# Patient Record
Sex: MALE | Race: WHITE | NOT HISPANIC OR LATINO
[De-identification: names, ages, dates, MRNs, and addresses within clinical notes are randomized per-mention and may not be internally consistent; named-entity substitution may affect disease eponyms.]

---

## 2017-02-17 ENCOUNTER — MEDICATION RENEWAL (OUTPATIENT)
Age: 52
End: 2017-02-17

## 2017-10-13 ENCOUNTER — RX RENEWAL (OUTPATIENT)
Age: 52
End: 2017-10-13

## 2018-02-26 ENCOUNTER — NON-APPOINTMENT (OUTPATIENT)
Age: 53
End: 2018-02-26

## 2018-02-26 ENCOUNTER — APPOINTMENT (OUTPATIENT)
Dept: CARDIOLOGY | Facility: CLINIC | Age: 53
End: 2018-02-26
Payer: COMMERCIAL

## 2018-02-26 VITALS
OXYGEN SATURATION: 96 % | BODY MASS INDEX: 25.05 KG/M2 | DIASTOLIC BLOOD PRESSURE: 99 MMHG | HEIGHT: 70 IN | WEIGHT: 175 LBS | HEART RATE: 82 BPM | SYSTOLIC BLOOD PRESSURE: 156 MMHG

## 2018-02-26 DIAGNOSIS — I65.29 OCCLUSION AND STENOSIS OF UNSPECIFIED CAROTID ARTERY: ICD-10-CM

## 2018-02-26 DIAGNOSIS — R09.89 OTHER SPECIFIED SYMPTOMS AND SIGNS INVOLVING THE CIRCULATORY AND RESPIRATORY SYSTEMS: ICD-10-CM

## 2018-02-26 PROCEDURE — 99215 OFFICE O/P EST HI 40 MIN: CPT

## 2018-02-26 PROCEDURE — 93000 ELECTROCARDIOGRAM COMPLETE: CPT

## 2018-03-26 ENCOUNTER — RX RENEWAL (OUTPATIENT)
Age: 53
End: 2018-03-26

## 2018-03-29 ENCOUNTER — RX RENEWAL (OUTPATIENT)
Age: 53
End: 2018-03-29

## 2018-05-15 ENCOUNTER — TRANSCRIPTION ENCOUNTER (OUTPATIENT)
Age: 53
End: 2018-05-15

## 2018-05-16 ENCOUNTER — OUTPATIENT (OUTPATIENT)
Dept: OUTPATIENT SERVICES | Facility: HOSPITAL | Age: 53
LOS: 1 days | Discharge: HOME | End: 2018-05-16
Payer: COMMERCIAL

## 2018-05-16 ENCOUNTER — RESULT REVIEW (OUTPATIENT)
Age: 53
End: 2018-05-16

## 2018-05-16 DIAGNOSIS — K21.0 GASTRO-ESOPHAGEAL REFLUX DISEASE WITH ESOPHAGITIS: ICD-10-CM

## 2018-05-16 PROCEDURE — 88305 TISSUE EXAM BY PATHOLOGIST: CPT

## 2018-05-16 PROCEDURE — 88305 TISSUE EXAM BY PATHOLOGIST: CPT | Mod: 26

## 2018-05-16 PROCEDURE — 43239 EGD BIOPSY SINGLE/MULTIPLE: CPT

## 2018-05-16 PROCEDURE — 88312 SPECIAL STAINS GROUP 1: CPT | Mod: 26

## 2018-05-16 PROCEDURE — 88312 SPECIAL STAINS GROUP 1: CPT

## 2018-05-17 LAB — SURGICAL PATHOLOGY FINAL REPORT - CH: SIGNIFICANT CHANGE UP

## 2018-12-31 ENCOUNTER — RX RENEWAL (OUTPATIENT)
Age: 53
End: 2018-12-31

## 2019-01-22 ENCOUNTER — APPOINTMENT (OUTPATIENT)
Dept: CARDIOLOGY | Facility: CLINIC | Age: 54
End: 2019-01-22
Payer: COMMERCIAL

## 2019-01-22 PROCEDURE — 93306 TTE W/DOPPLER COMPLETE: CPT

## 2019-01-24 ENCOUNTER — APPOINTMENT (OUTPATIENT)
Dept: CARDIOLOGY | Facility: CLINIC | Age: 54
End: 2019-01-24
Payer: COMMERCIAL

## 2019-02-07 ENCOUNTER — APPOINTMENT (OUTPATIENT)
Dept: CARDIOLOGY | Facility: CLINIC | Age: 54
End: 2019-02-07
Payer: COMMERCIAL

## 2019-02-07 PROCEDURE — 93880 EXTRACRANIAL BILAT STUDY: CPT

## 2019-02-19 ENCOUNTER — NON-APPOINTMENT (OUTPATIENT)
Age: 54
End: 2019-02-19

## 2019-02-19 ENCOUNTER — APPOINTMENT (OUTPATIENT)
Dept: CARDIOLOGY | Facility: CLINIC | Age: 54
End: 2019-02-19
Payer: COMMERCIAL

## 2019-02-19 ENCOUNTER — APPOINTMENT (OUTPATIENT)
Dept: CARDIOLOGY | Facility: CLINIC | Age: 54
End: 2019-02-19

## 2019-02-19 VITALS
HEIGHT: 70 IN | HEART RATE: 82 BPM | OXYGEN SATURATION: 97 % | DIASTOLIC BLOOD PRESSURE: 87 MMHG | BODY MASS INDEX: 24.91 KG/M2 | SYSTOLIC BLOOD PRESSURE: 134 MMHG | WEIGHT: 174 LBS

## 2019-02-19 PROCEDURE — 99215 OFFICE O/P EST HI 40 MIN: CPT

## 2019-02-19 PROCEDURE — 93000 ELECTROCARDIOGRAM COMPLETE: CPT

## 2019-02-19 NOTE — REVIEW OF SYSTEMS
[see HPI] : see HPI [Negative] : Heme/Lymph [Fever] : no fever [Headache] : no headache [Chills] : no chills [Feeling Fatigued] : not feeling fatigued [Blurry Vision] : no blurred vision [Skin: A Rash] : no rash:

## 2019-02-19 NOTE — HISTORY OF PRESENT ILLNESS
[FreeTextEntry1] : I saw Mateusz Quiñones in the office today for followup. He is a 53 y-o- white male with history of paroxysmal atrial fibrillation who underwent a pulmonary vein ablation procedure at [facility] in January 2011. He had been maintained on sotalol, pradaxa, and atenolol.He is off pradaxa and now on aspirin.\par \par In 2013 he was having more atrial fibrillation was found to be hyperthyroid, Since this was treated his atrial fibrillation has been much improved. Last year he was having rare episodes of atrial fibrillation mostly occurring after he had a sixpack of beer. We started him back on sotalol 40 mg twice a day and over the course of the year he has had at most 4 or 5 episodes lasting up to 4 hours. Again the majority episodes occur after he has drank beer. He has no chest pain, or shortness of breath.. \par \par He also had a ramus branch drug-eluting stent placed in January 2011.  He is also being followed for mitral valve polyps with mild mitral insufficiency on an echo in 6/12  A repeat echo performed one/19 demonstrates a posterior mitral prolapse with an increase in the amount of MR, now moderate in severity. He has finally stopped smoking, but has gained some weight.\par \par He had surgery on his cervical spine and has been doing well.\par \par We did a regular stress test in June 2013 that showed no ischemia. He had a carotid Doppler that shows mild plaque. Resting 12-lead electrocardiogram demonstrates sinus rhythm and appears to be within normal limits.

## 2019-02-19 NOTE — DISCUSSION/SUMMARY
[FreeTextEntry1] : The patient is doing well. Has no signs or symptoms of active heart disease.He is sStill having episodes clinically of atrial fibrillation although they are short-lived. He is going to make an appointment to see Dr. Gamino who did his initial ablation to get an opinion. He will stay on his present medication. We did discuss the positive going back on anticoagulant which is not in favor of doing. Does understand there is some risk of developing a stroke if he goes into prolonged atrial fibrillation. Also discussed about cutting back on his weekend drinking.\par \par Going to increase enalapril to 10 mg a day. Assuming he has no side effects this may help to reduce the mitral insufficiency. I would appreciate your  sending copies of his recent blood work for my review.\par \par He has an appointment for stress test later this week which will discuss.\par \par Further recommendations will be made pending the above. I will see the patient again in approximately 6 months.

## 2019-02-19 NOTE — PHYSICAL EXAM
[General Appearance - Well Developed] : well developed [Normal Appearance] : normal appearance [Well Groomed] : well groomed [General Appearance - Well Nourished] : well nourished [No Deformities] : no deformities [General Appearance - In No Acute Distress] : no acute distress [Normal Conjunctiva] : the conjunctiva exhibited no abnormalities [Eyelids - No Xanthelasma] : the eyelids demonstrated no xanthelasmas [Normal Oral Mucosa] : normal oral mucosa [Normal Jugular Venous A Waves Present] : normal jugular venous A waves present [Normal Jugular Venous V Waves Present] : normal jugular venous V waves present [No Jugular Venous Riggs A Waves] : no jugular venous riggs A waves [Respiration, Rhythm And Depth] : normal respiratory rhythm and effort [Exaggerated Use Of Accessory Muscles For Inspiration] : no accessory muscle use [Auscultation Breath Sounds / Voice Sounds] : lungs were clear to auscultation bilaterally [Bowel Sounds] : normal bowel sounds [Abdomen Soft] : soft [Abdomen Tenderness] : non-tender [Abnormal Walk] : normal gait [Gait - Sufficient For Exercise Testing] : the gait was sufficient for exercise testing [Nail Clubbing] : no clubbing of the fingernails [Cyanosis, Localized] : no localized cyanosis [Skin Color & Pigmentation] : normal skin color and pigmentation [Skin Turgor] : normal skin turgor [] : no rash [Oriented To Time, Place, And Person] : oriented to person, place, and time [Impaired Insight] : insight and judgment were intact [Affect] : the affect was normal [Mood] : the mood was normal [No Anxiety] : not feeling anxious [5th Left ICS - MCL] : palpated at the 5th LICS in the midclavicular line [Normal] : normal [Normal Rate] : normal [Rhythm Regular] : regular [Normal S1] : normal S1 [Normal S2] : normal S2 [No Gallop] : no gallop heard [II] : a grade 2 [2+] : left 2+ [1+] : left 1+ [No Abnormalities] : the abdominal aorta was not enlarged and no bruit was heard [No Pitting Edema] : no pitting edema present [FreeTextEntry1] : Minimal scattered rhonchi with minimal wheeze [Apical Thrill] : no thrill palpable at the apex [Click] : no click [Pericardial Rub] : no pericardial rub

## 2019-02-19 NOTE — REASON FOR VISIT
[Follow-Up - Clinic] : a clinic follow-up of [Atrial Fibrillation] : atrial fibrillation [Chest Pain] : chest pain [Coronary Artery Disease] : coronary artery disease [Hyperlipidemia] : hyperlipidemia [Hypertension] : hypertension

## 2019-02-28 ENCOUNTER — APPOINTMENT (OUTPATIENT)
Dept: CARDIOLOGY | Facility: CLINIC | Age: 54
End: 2019-02-28

## 2019-05-13 ENCOUNTER — RX RENEWAL (OUTPATIENT)
Age: 54
End: 2019-05-13

## 2019-06-11 ENCOUNTER — RX RENEWAL (OUTPATIENT)
Age: 54
End: 2019-06-11

## 2019-07-08 ENCOUNTER — MEDICATION RENEWAL (OUTPATIENT)
Age: 54
End: 2019-07-08

## 2019-08-07 ENCOUNTER — RX RENEWAL (OUTPATIENT)
Age: 54
End: 2019-08-07

## 2020-05-10 ENCOUNTER — RX RENEWAL (OUTPATIENT)
Age: 55
End: 2020-05-10

## 2020-05-21 ENCOUNTER — RX RENEWAL (OUTPATIENT)
Age: 55
End: 2020-05-21

## 2020-06-18 ENCOUNTER — NON-APPOINTMENT (OUTPATIENT)
Age: 55
End: 2020-06-18

## 2020-06-18 ENCOUNTER — APPOINTMENT (OUTPATIENT)
Dept: CARDIOLOGY | Facility: CLINIC | Age: 55
End: 2020-06-18
Payer: COMMERCIAL

## 2020-06-18 VITALS
HEART RATE: 60 BPM | DIASTOLIC BLOOD PRESSURE: 89 MMHG | BODY MASS INDEX: 24.34 KG/M2 | HEIGHT: 70 IN | WEIGHT: 170 LBS | OXYGEN SATURATION: 99 % | SYSTOLIC BLOOD PRESSURE: 133 MMHG

## 2020-06-18 DIAGNOSIS — I34.1 NONRHEUMATIC MITRAL (VALVE) PROLAPSE: ICD-10-CM

## 2020-06-18 PROCEDURE — 99214 OFFICE O/P EST MOD 30 MIN: CPT

## 2020-06-18 PROCEDURE — 93000 ELECTROCARDIOGRAM COMPLETE: CPT

## 2020-06-18 NOTE — PHYSICAL EXAM
[General Appearance - Well Developed] : well developed [Normal Appearance] : normal appearance [General Appearance - Well Nourished] : well nourished [Well Groomed] : well groomed [No Deformities] : no deformities [General Appearance - In No Acute Distress] : no acute distress [Normal Conjunctiva] : the conjunctiva exhibited no abnormalities [Eyelids - No Xanthelasma] : the eyelids demonstrated no xanthelasmas [Normal Oral Mucosa] : normal oral mucosa [Normal Jugular Venous A Waves Present] : normal jugular venous A waves present [No Jugular Venous Riggs A Waves] : no jugular venous riggs A waves [Normal Jugular Venous V Waves Present] : normal jugular venous V waves present [Respiration, Rhythm And Depth] : normal respiratory rhythm and effort [Exaggerated Use Of Accessory Muscles For Inspiration] : no accessory muscle use [Auscultation Breath Sounds / Voice Sounds] : lungs were clear to auscultation bilaterally [Bowel Sounds] : normal bowel sounds [Abdomen Soft] : soft [Abnormal Walk] : normal gait [Abdomen Tenderness] : non-tender [Gait - Sufficient For Exercise Testing] : the gait was sufficient for exercise testing [Nail Clubbing] : no clubbing of the fingernails [Cyanosis, Localized] : no localized cyanosis [Skin Turgor] : normal skin turgor [Skin Color & Pigmentation] : normal skin color and pigmentation [] : no rash [Affect] : the affect was normal [Impaired Insight] : insight and judgment were intact [Oriented To Time, Place, And Person] : oriented to person, place, and time [Mood] : the mood was normal [No Anxiety] : not feeling anxious [Normal] : normal [5th Left ICS - MCL] : palpated at the 5th LICS in the midclavicular line [Rhythm Regular] : regular [Normal Rate] : normal [Normal S2] : normal S2 [Normal S1] : normal S1 [No Gallop] : no gallop heard [II] : a grade 2 [2+] : left 2+ [1+] : right 1+ [No Abnormalities] : the abdominal aorta was not enlarged and no bruit was heard [No Pitting Edema] : no pitting edema present [FreeTextEntry1] : Minimal scattered rhonchi with minimal wheeze [Click] : no click [Apical Thrill] : no thrill palpable at the apex [Pericardial Rub] : no pericardial rub

## 2020-06-18 NOTE — HISTORY OF PRESENT ILLNESS
[FreeTextEntry1] : I saw Mateusz Quiñones in the office today for followup. He is a 54 y-o- white male with history of paroxysmal atrial fibrillation who underwent a pulmonary vein ablation procedure at [facility] in January 2011. He had been maintained on sotalol, pradaxa, and atenolol.He is off pradaxa and now on aspirin.\par \par In 2013 he was having more atrial fibrillation was found to be hyperthyroid, Since this was treated his atrial fibrillation has been much improved. He was having rare episodes of atrial fibrillation mostly occurring after he had a sixpack of beer. We started him back on sotalol 40 mg twice a day and over the course of the year he has had at most 4 or 5 episodes lasting up to 4 hours. Again the majority episodes occur after he has drank beer. He has no chest pain, or shortness of breath.. \par \par He also had a ramus branch drug-eluting stent placed in January 2011.  He is also being followed for mitral valve polyps with mild mitral insufficiency on an echo in 6/12  A repeat echo performed 1/19 demonstrates a posterior mitral prolapse with an increase in the amount of MR, now moderate in severity. He has finally stopped smoking, but has gained some weight.\par \par He had surgery on his cervical spine and has been doing well.\par \par We did a regular stress test in June 2013 that showed no ischemia. He had a carotid Doppler that shows mild plaque. Resting 12-lead electrocardiogram demonstrates sinus rhythm and appears to be within normal limits.\par \par The patient is physically active and denies any chest pain or shortness of breath. On rare occasions, especially after having alcohol he does get short lived episodes of atrial fibrillation that are hemodynamically stable

## 2020-06-18 NOTE — DISCUSSION/SUMMARY
[FreeTextEntry1] : Clinically the patient is doing well. He denies any exertional chest pain or shortness of breath. Blood pressure is well controlled. .ECG is normal.\par \par He will schedule a nuclear stress test. He occasionally gets a discomfort in his chest that most likely is noncardiac. It's been a long time since his last stress test and his stent. He also will followup on an echocardiogram to check on his mitral valve prolapse.\par \par Would appreciate sending copies most recent blood work. We did discuss the importance of diet, exercise, and weight control.\par \par I reviewed his medication and carotid doppler,  and answered his questions. Assuming his tests are okay and the patient feels well I would see him again in approximately one year. He knows to call the office if any problems arise.

## 2020-06-18 NOTE — REVIEW OF SYSTEMS
[see HPI] : see HPI [Negative] : Heme/Lymph [Fever] : no fever [Headache] : no headache [Chills] : no chills [Feeling Fatigued] : not feeling fatigued [Skin: A Rash] : no rash: [Blurry Vision] : no blurred vision

## 2020-08-17 ENCOUNTER — RX RENEWAL (OUTPATIENT)
Age: 55
End: 2020-08-17

## 2020-09-16 ENCOUNTER — APPOINTMENT (OUTPATIENT)
Dept: CARDIOLOGY | Facility: CLINIC | Age: 55
End: 2020-09-16

## 2020-10-22 ENCOUNTER — APPOINTMENT (OUTPATIENT)
Dept: CARDIOLOGY | Facility: CLINIC | Age: 55
End: 2020-10-22
Payer: COMMERCIAL

## 2020-10-22 PROCEDURE — 93306 TTE W/DOPPLER COMPLETE: CPT

## 2020-10-22 PROCEDURE — 99072 ADDL SUPL MATRL&STAF TM PHE: CPT

## 2020-12-31 ENCOUNTER — RX RENEWAL (OUTPATIENT)
Age: 55
End: 2020-12-31

## 2021-01-07 RX ORDER — RAMIPRIL 5 MG/1
5 CAPSULE ORAL
Qty: 90 | Refills: 3 | Status: DISCONTINUED | COMMUNITY
Start: 2019-05-13 | End: 2021-01-07

## 2021-01-22 ENCOUNTER — APPOINTMENT (OUTPATIENT)
Dept: CARDIOLOGY | Facility: CLINIC | Age: 56
End: 2021-01-22
Payer: COMMERCIAL

## 2021-01-22 PROCEDURE — 78452 HT MUSCLE IMAGE SPECT MULT: CPT

## 2021-01-22 PROCEDURE — 99072 ADDL SUPL MATRL&STAF TM PHE: CPT

## 2021-01-22 PROCEDURE — 93015 CV STRESS TEST SUPVJ I&R: CPT

## 2021-01-22 PROCEDURE — A9500: CPT

## 2021-01-26 ENCOUNTER — RX RENEWAL (OUTPATIENT)
Age: 56
End: 2021-01-26

## 2021-03-12 ENCOUNTER — NON-APPOINTMENT (OUTPATIENT)
Age: 56
End: 2021-03-12

## 2021-07-21 ENCOUNTER — RX RENEWAL (OUTPATIENT)
Age: 56
End: 2021-07-21

## 2021-07-23 ENCOUNTER — RX RENEWAL (OUTPATIENT)
Age: 56
End: 2021-07-23

## 2021-11-12 ENCOUNTER — OUTPATIENT (OUTPATIENT)
Dept: OUTPATIENT SERVICES | Facility: HOSPITAL | Age: 56
LOS: 1 days | End: 2021-11-12
Payer: COMMERCIAL

## 2021-11-12 ENCOUNTER — APPOINTMENT (OUTPATIENT)
Dept: RADIOLOGY | Facility: CLINIC | Age: 56
End: 2021-11-12
Payer: COMMERCIAL

## 2021-11-12 DIAGNOSIS — Z00.8 ENCOUNTER FOR OTHER GENERAL EXAMINATION: ICD-10-CM

## 2021-11-12 PROCEDURE — 72050 X-RAY EXAM NECK SPINE 4/5VWS: CPT | Mod: 26

## 2021-11-12 PROCEDURE — 72050 X-RAY EXAM NECK SPINE 4/5VWS: CPT

## 2022-01-11 ENCOUNTER — RX RENEWAL (OUTPATIENT)
Age: 57
End: 2022-01-11

## 2022-01-14 ENCOUNTER — RX RENEWAL (OUTPATIENT)
Age: 57
End: 2022-01-14

## 2022-01-25 ENCOUNTER — APPOINTMENT (OUTPATIENT)
Dept: CARDIOLOGY | Facility: CLINIC | Age: 57
End: 2022-01-25
Payer: COMMERCIAL

## 2022-01-25 ENCOUNTER — NON-APPOINTMENT (OUTPATIENT)
Age: 57
End: 2022-01-25

## 2022-01-25 VITALS
SYSTOLIC BLOOD PRESSURE: 161 MMHG | BODY MASS INDEX: 23.62 KG/M2 | HEART RATE: 75 BPM | HEIGHT: 70 IN | DIASTOLIC BLOOD PRESSURE: 99 MMHG | OXYGEN SATURATION: 97 % | WEIGHT: 165 LBS

## 2022-01-25 DIAGNOSIS — E78.5 HYPERLIPIDEMIA, UNSPECIFIED: ICD-10-CM

## 2022-01-25 DIAGNOSIS — I10 ESSENTIAL (PRIMARY) HYPERTENSION: ICD-10-CM

## 2022-01-25 DIAGNOSIS — I48.0 PAROXYSMAL ATRIAL FIBRILLATION: ICD-10-CM

## 2022-01-25 DIAGNOSIS — I25.10 ATHEROSCLEROTIC HEART DISEASE OF NATIVE CORONARY ARTERY W/OUT ANGINA PECTORIS: ICD-10-CM

## 2022-01-25 PROCEDURE — 99214 OFFICE O/P EST MOD 30 MIN: CPT

## 2022-01-25 PROCEDURE — 93000 ELECTROCARDIOGRAM COMPLETE: CPT

## 2022-01-25 NOTE — HISTORY OF PRESENT ILLNESS
[FreeTextEntry1] : I saw Mateusz Quiñones in the office today for followup. He is a 56 y-o- white male with history of paroxysmal atrial fibrillation who underwent a pulmonary vein ablation procedure at [facility] in January 2011. He had been maintained on sotalol, pradaxa, and atenolol.He is off pradaxa and now on aspirin.\par \par In 2013 he was having more atrial fibrillation was found to be hyperthyroid, Since this was treated his atrial fibrillation has been much improved. He was having rare episodes of atrial fibrillation mostly occurring after he had a sixpack of beer. We started him back on sotalol 40 mg twice a day and over the course of the year he has had at most 4 or 5 episodes lasting up to 4 hours. Again the majority episodes occur after he has drank beer. He has no chest pain, or shortness of breath.. \par \par He also had a ramus branch drug-eluting stent placed in January 2011.  He is also being followed for mitral valve polyps with mild mitral insufficiency on an echo in 6/12  A repeat echo performed 10/20 demonstrates mild-mod MR, mild TR, LVH, stage I DD.. He has finally stopped smoking, but has gained some weight.  \par \par He had surgery on his cervical spine and has been doing well.\par \par Stress test 1/21 was normal at workload of 8 METS.  EF 62%.  He had a carotid Doppler that shows mild plaque. Resting 12-lead electrocardiogram demonstrates sinus rhythm and appears to be within normal limits.\par \[facility] Had a second A. fib ablation at [facility] 4/21 by Dr. Gamino.  He was placed on Xarelto which he stopped on his own about 5 months ago.  Told him that most likely Dr. Gamino would want him to continue this medication and he will call his office to find out.  He has been feeling quite well without any known episodes of atrial fibrillation. He still gets some irregular heartbeat.

## 2022-01-25 NOTE — DISCUSSION/SUMMARY
[FreeTextEntry1] : Clinically the patient is doing well.  On his medications blood pressure is well controlled.  Had normal stress test last year and a stable echocardiogram.  He is now almost year out from the second A. fib ablation.\par \par He will stay on his present medications and will follow-up with Dr. Gamino to find out about chronic anticoagulation.  We appreciate a copy of the most recent blood work for my review.  We did go over his medications.  We discussed the goal of cholesterol lowering, to keep the LDL less than 70.\par \par Patient will schedule an echocardiogram.  If all is well we will see him again in 1 year.  If he does have symptoms he would call. Danger to self

## 2022-07-08 ENCOUNTER — APPOINTMENT (OUTPATIENT)
Dept: MRI IMAGING | Facility: CLINIC | Age: 57
End: 2022-07-08

## 2022-07-08 ENCOUNTER — OUTPATIENT (OUTPATIENT)
Dept: OUTPATIENT SERVICES | Facility: HOSPITAL | Age: 57
LOS: 1 days | End: 2022-07-08
Payer: COMMERCIAL

## 2022-07-08 DIAGNOSIS — Z00.8 ENCOUNTER FOR OTHER GENERAL EXAMINATION: ICD-10-CM

## 2022-07-08 DIAGNOSIS — M54.12 RADICULOPATHY, CERVICAL REGION: ICD-10-CM

## 2022-07-08 PROCEDURE — 72141 MRI NECK SPINE W/O DYE: CPT

## 2022-07-08 PROCEDURE — 72141 MRI NECK SPINE W/O DYE: CPT | Mod: 26

## 2022-08-02 ENCOUNTER — OUTPATIENT (OUTPATIENT)
Dept: OUTPATIENT SERVICES | Facility: HOSPITAL | Age: 57
LOS: 1 days | End: 2022-08-02
Payer: COMMERCIAL

## 2022-08-02 DIAGNOSIS — Z20.828 CONTACT WITH AND (SUSPECTED) EXPOSURE TO OTHER VIRAL COMMUNICABLE DISEASES: ICD-10-CM

## 2022-08-02 LAB — SARS-COV-2 RNA SPEC QL NAA+PROBE: SIGNIFICANT CHANGE UP

## 2022-08-02 PROCEDURE — U0003: CPT

## 2022-08-02 PROCEDURE — U0005: CPT

## 2022-08-04 ENCOUNTER — OUTPATIENT (OUTPATIENT)
Dept: OUTPATIENT SERVICES | Facility: HOSPITAL | Age: 57
LOS: 1 days | End: 2022-08-04
Payer: COMMERCIAL

## 2022-08-04 DIAGNOSIS — M54.12 RADICULOPATHY, CERVICAL REGION: ICD-10-CM

## 2022-08-04 DIAGNOSIS — M54.16 RADICULOPATHY, LUMBAR REGION: ICD-10-CM

## 2022-08-04 PROCEDURE — 62321 NJX INTERLAMINAR CRV/THRC: CPT

## 2022-12-19 ENCOUNTER — RX RENEWAL (OUTPATIENT)
Age: 57
End: 2022-12-19

## 2023-02-08 ENCOUNTER — APPOINTMENT (OUTPATIENT)
Dept: CARDIOLOGY | Facility: CLINIC | Age: 58
End: 2023-02-08

## 2023-03-07 ENCOUNTER — RX RENEWAL (OUTPATIENT)
Age: 58
End: 2023-03-07

## 2023-04-07 RX ORDER — RAMIPRIL 5 MG/1
5 CAPSULE ORAL
Qty: 90 | Refills: 0 | Status: ACTIVE | COMMUNITY
Start: 2020-12-31

## 2023-04-14 ENCOUNTER — RX RENEWAL (OUTPATIENT)
Age: 58
End: 2023-04-14

## 2023-07-07 ENCOUNTER — APPOINTMENT (OUTPATIENT)
Dept: CARDIOLOGY | Facility: CLINIC | Age: 58
End: 2023-07-07

## 2023-07-14 ENCOUNTER — RX RENEWAL (OUTPATIENT)
Age: 58
End: 2023-07-14

## 2023-10-14 ENCOUNTER — RX RENEWAL (OUTPATIENT)
Age: 58
End: 2023-10-14

## 2023-11-15 ENCOUNTER — APPOINTMENT (OUTPATIENT)
Dept: ORTHOPEDIC SURGERY | Facility: CLINIC | Age: 58
End: 2023-11-15
Payer: OTHER MISCELLANEOUS

## 2023-11-15 VITALS — BODY MASS INDEX: 24.34 KG/M2 | HEIGHT: 70 IN | WEIGHT: 170 LBS

## 2023-11-15 DIAGNOSIS — S40.011A CONTUSION OF RIGHT SHOULDER, INITIAL ENCOUNTER: ICD-10-CM

## 2023-11-15 DIAGNOSIS — S70.01XA CONTUSION OF RIGHT HIP, INITIAL ENCOUNTER: ICD-10-CM

## 2023-11-15 PROCEDURE — 73030 X-RAY EXAM OF SHOULDER: CPT | Mod: RT

## 2023-11-15 PROCEDURE — 99244 OFF/OP CNSLTJ NEW/EST MOD 40: CPT

## 2023-11-15 PROCEDURE — 73502 X-RAY EXAM HIP UNI 2-3 VIEWS: CPT

## 2023-11-15 RX ORDER — DICLOFENAC POTASSIUM 50 MG/1
50 TABLET, COATED ORAL TWICE DAILY
Qty: 14 | Refills: 0 | Status: ACTIVE | COMMUNITY
Start: 2023-11-15 | End: 1900-01-01

## 2023-11-22 ENCOUNTER — APPOINTMENT (OUTPATIENT)
Dept: ORTHOPEDIC SURGERY | Facility: CLINIC | Age: 58
End: 2023-11-22
Payer: OTHER MISCELLANEOUS

## 2023-11-22 ENCOUNTER — APPOINTMENT (OUTPATIENT)
Dept: MRI IMAGING | Facility: CLINIC | Age: 58
End: 2023-11-22
Payer: OTHER MISCELLANEOUS

## 2023-11-22 VITALS — HEIGHT: 70 IN | BODY MASS INDEX: 24.34 KG/M2 | WEIGHT: 170 LBS

## 2023-11-22 DIAGNOSIS — N20.0 CALCULUS OF KIDNEY: ICD-10-CM

## 2023-11-22 PROCEDURE — 73221 MRI JOINT UPR EXTREM W/O DYE: CPT | Mod: RT

## 2023-11-22 PROCEDURE — 99213 OFFICE O/P EST LOW 20 MIN: CPT

## 2023-12-05 ENCOUNTER — APPOINTMENT (OUTPATIENT)
Dept: ORTHOPEDIC SURGERY | Facility: CLINIC | Age: 58
End: 2023-12-05
Payer: OTHER MISCELLANEOUS

## 2023-12-05 VITALS — WEIGHT: 170 LBS | HEIGHT: 70 IN | BODY MASS INDEX: 24.34 KG/M2

## 2023-12-05 PROCEDURE — 99213 OFFICE O/P EST LOW 20 MIN: CPT

## 2023-12-05 RX ORDER — DICLOFENAC POTASSIUM 50 MG/1
50 TABLET, COATED ORAL TWICE DAILY
Qty: 14 | Refills: 1 | Status: ACTIVE | COMMUNITY
Start: 2023-12-05 | End: 1900-01-01

## 2024-01-30 ENCOUNTER — APPOINTMENT (OUTPATIENT)
Dept: ORTHOPEDIC SURGERY | Facility: CLINIC | Age: 59
End: 2024-01-30
Payer: COMMERCIAL

## 2024-01-30 PROCEDURE — 99243 OFF/OP CNSLTJ NEW/EST LOW 30: CPT

## 2024-01-30 RX ORDER — DICLOFENAC POTASSIUM 50 MG/1
50 TABLET, COATED ORAL TWICE DAILY
Qty: 14 | Refills: 1 | Status: ACTIVE | COMMUNITY
Start: 2024-01-30 | End: 1900-01-01

## 2024-01-30 NOTE — HISTORY OF PRESENT ILLNESS
[7] : 7 [1] : 2 [Radiating] : radiating [Sharp] : sharp [Intermittent] : intermittent [Nothing helps with pain getting better] : Nothing helps with pain getting better [Sitting] : sitting [Full time] : Work status: full time [] : Post Surgical Visit: no [FreeTextEntry1] : right hip [FreeTextEntry7] : foot [de-identified] : driving

## 2024-01-30 NOTE — ASSESSMENT
[FreeTextEntry1] : Patient will be maintained on anti-inflammatory medication and an MRI will be scheduled to evaluate possible labral damage in the right hip he will return after the MRI is performed.  He will also attend some physical therapy to try and decrease the pain and increase his range of motion and strength and normalize his gait.  He will return to discuss the MRI scan

## 2024-01-30 NOTE — REASON FOR VISIT
[FreeTextEntry2] : NI - right hip Patient returns to the office with increasing complaints of the complaints in the right hip region some days are better and worse than others based on activity and weather.  Today he is ambulating with a mild antalgic gait on the right side he does have some localized tenderness tenderness and tendinitis over the right hip bursa his hip range of motion is mildly limited to internal rotation neurovascular status grossly intact in the right lower extremity.

## 2024-02-14 ENCOUNTER — APPOINTMENT (OUTPATIENT)
Dept: ORTHOPEDIC SURGERY | Facility: CLINIC | Age: 59
End: 2024-02-14

## 2024-02-15 ENCOUNTER — RESULT REVIEW (OUTPATIENT)
Age: 59
End: 2024-02-15

## 2024-02-21 ENCOUNTER — APPOINTMENT (OUTPATIENT)
Dept: ORTHOPEDIC SURGERY | Facility: CLINIC | Age: 59
End: 2024-02-21
Payer: OTHER MISCELLANEOUS

## 2024-02-21 PROCEDURE — 20605 DRAIN/INJ JOINT/BURSA W/O US: CPT | Mod: RT

## 2024-02-21 PROCEDURE — 99213 OFFICE O/P EST LOW 20 MIN: CPT | Mod: 25

## 2024-02-21 NOTE — ASSESSMENT
[FreeTextEntry1] : Patient was given a cortisone shot into the AC joint of his right shoulder today and hopefully this will give him some relief and also confirm the diagnosis of AC impingement shown on the MRI at this point it is unlikely that he will get any further improvement with conservative treatment and once again we are requesting authorization for the AC decompression and manipulation his MRI shows AC degenerative changes with spur formation and impingement on the underlying rotator cuff I am concerned that if he does not have his surgery in the near future he may end up ultimately tearing his rotator cuff and ending up with a more significant problem and return in 3 to 4 weeks and hopefully will have authorization for surgery at that point in time.

## 2024-02-21 NOTE — HISTORY OF PRESENT ILLNESS
[Work related] : work related [Throbbing] : throbbing [Constant] : constant [Meds] : meds [Walking] : walking [Full time] : Work status: full time [5] : 5 [de-identified] : 02/24/24: Patient returns to follow up on his right shoulder. He states the anti-inflammatory medication is not helping.  12/5/23: Patient returns to review the results of his right shoulder MRI.  11/15/23: Initial visit for this 59 y/o RHD M here for right hip and right shoulder pain from a work-related injury on 11/2/2023. He works for the town of [place]. 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 No prior injury to right side. [] : Post Surgical Visit: no [FreeTextEntry1] : right hip and right shoulder [FreeTextEntry3] : 11/2/23 [de-identified] : driving [de-identified] : mri [de-identified] : 6+ years ago [de-identified] : left shoulder

## 2024-02-21 NOTE — PROCEDURE
[FreeTextEntry3] : Combination of 1 cc of lidocaine and 1 cc of Depo-Medrol are mixed together the area of the right shoulder was cleaned off with alcohol and injection was instilled into the AC joint patient tolerated the procedure well actually felt a little improvement after the shot was performed

## 2024-02-21 NOTE — REASON FOR VISIT
[FreeTextEntry2] :  WC - right shoulder Patient returns to the office in follow-up of his right shoulder he reports that he still has significant problems with his shoulder especially on external rotation and abduction of the shoulder use of the anti-inflammatory medicine did not significantly help his problem examination today reveals no significant swelling in the shoulder his range of motion is limited approximately 70% to both external rotation and abduction is forward flexion strength is 4 out of 5 his abduction strength is also about 4 out of 5 he has no gross instability he is locally tender manage mainly at the AC joint with less tenderness at the proximal biceps tendon and none at the deltoid bursa he has no gross instability in the shoulder neurovascular status grossly intact in the right upper extremity he has mild tenderness in the right trapezius region consistent with overuse of the upper back to compensate for his shoulder deficit and motion and strength

## 2024-02-28 ENCOUNTER — APPOINTMENT (OUTPATIENT)
Dept: ORTHOPEDIC SURGERY | Facility: CLINIC | Age: 59
End: 2024-02-28
Payer: COMMERCIAL

## 2024-02-28 DIAGNOSIS — Z95.818 PRESENCE OF OTHER CARDIAC IMPLANTS AND GRAFTS: ICD-10-CM

## 2024-02-28 PROCEDURE — 99213 OFFICE O/P EST LOW 20 MIN: CPT

## 2024-02-28 NOTE — HISTORY OF PRESENT ILLNESS
[7] : 7 [1] : 2 [Radiating] : radiating [Sharp] : sharp [Nothing helps with pain getting better] : Nothing helps with pain getting better [Intermittent] : intermittent [Sitting] : sitting [Full time] : Work status: full time [FreeTextEntry1] : right hip [] : Post Surgical Visit: no [FreeTextEntry7] : foot [de-identified] : driving

## 2024-02-28 NOTE — ASSESSMENT
[FreeTextEntry1] : Patient will be attend some physical therapy for the right hip area to try and increase his motion strength and normalize his gait.  He will continue to take the anti-inflammatory medication.  He will return in approximately 3 weeks.

## 2024-02-28 NOTE — REASON FOR VISIT
[FreeTextEntry2] : MRI - right hip Patient returns to the office in follow-up regarding his right hip his recent MRI is reviewed which shows significant degenerative changes in the hip in addition to some damage to the right hip labrum examination with a mild antalgic gait range of motion right hip is mildly limited to internal rotation he has some mild trochanteric bursitis but he can take a few steps on his toes and heels his flexion toward the floor is limited by hamstring and lower back tightness.

## 2024-03-13 ENCOUNTER — APPOINTMENT (OUTPATIENT)
Dept: ORTHOPEDIC SURGERY | Facility: CLINIC | Age: 59
End: 2024-03-13
Payer: COMMERCIAL

## 2024-03-13 VITALS — WEIGHT: 170 LBS | HEIGHT: 70 IN | BODY MASS INDEX: 24.34 KG/M2

## 2024-03-13 DIAGNOSIS — M24.159 OTHER ARTICULAR CARTILAGE DISORDERS, UNSPECIFIED HIP: ICD-10-CM

## 2024-03-13 PROCEDURE — 99213 OFFICE O/P EST LOW 20 MIN: CPT

## 2024-03-13 NOTE — ASSESSMENT
[FreeTextEntry1] : Presently is just started physical therapy for the right hip and will continue to do so do so 2-3 times a week and also on his arm he will return in 3 to 4 weeks to monitor his progress.
stretcher

## 2024-03-13 NOTE — HISTORY OF PRESENT ILLNESS
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

## 2024-03-13 NOTE — REASON FOR VISIT
[FreeTextEntry2] : Continued right hip pain Patient returns to the office in follow-up regarding his right hip he describes having physical therapy this morning and try to work on increasing his motion today he is ambulate with a mild antalgic gait examination of the right hip reveals some mild tenderness over the trochanteric bursa's internal rotation on the right hip is moderately limited he has no gross instability and neurovascular status in the right lower extremity is grossly normal

## 2024-03-30 ENCOUNTER — RX RENEWAL (OUTPATIENT)
Age: 59
End: 2024-03-30

## 2024-04-10 ENCOUNTER — APPOINTMENT (OUTPATIENT)
Dept: ORTHOPEDIC SURGERY | Facility: CLINIC | Age: 59
End: 2024-04-10
Payer: OTHER MISCELLANEOUS

## 2024-04-10 VITALS — HEIGHT: 70 IN | WEIGHT: 170 LBS | BODY MASS INDEX: 24.34 KG/M2

## 2024-04-10 DIAGNOSIS — M25.811 OTHER SPECIFIED JOINT DISORDERS, RIGHT SHOULDER: ICD-10-CM

## 2024-04-10 DIAGNOSIS — M47.816 SPONDYLOSIS W/OUT MYELOPATHY OR RADICULOPATHY, LUMBAR REGION: ICD-10-CM

## 2024-04-10 PROCEDURE — 20605 DRAIN/INJ JOINT/BURSA W/O US: CPT | Mod: RT

## 2024-04-10 PROCEDURE — 99213 OFFICE O/P EST LOW 20 MIN: CPT | Mod: 25

## 2024-04-10 NOTE — PROCEDURE
[FreeTextEntry3] : Combination of 1 cc of lidocaine and 1 cc of dexamethasone mixed together the area on the front of the right shoulder AC joint was cleaned off with alcohol and injection was administered into the AC joint patient tolerated the procedure well

## 2024-04-10 NOTE — ASSESSMENT
[FreeTextEntry1] : Hopefully the injection today will help alleviate some of his symptoms and allow the therapist to try and increase his motion and strength in the right shoulder I still believe that he is likely to need an AC decompression to alleviate his symptoms on a chronic basis and prevent further damage to the rotator cuff in the future he will return in 3 to 4 weeks to discuss this further

## 2024-04-30 ENCOUNTER — APPOINTMENT (OUTPATIENT)
Dept: ORTHOPEDIC SURGERY | Facility: CLINIC | Age: 59
End: 2024-04-30
Payer: COMMERCIAL

## 2024-04-30 VITALS — WEIGHT: 170 LBS | BODY MASS INDEX: 24.34 KG/M2 | HEIGHT: 70 IN

## 2024-04-30 DIAGNOSIS — M19.049 PRIMARY OSTEOARTHRITIS, UNSPECIFIED HAND: ICD-10-CM

## 2024-04-30 DIAGNOSIS — M16.10 UNILATERAL PRIMARY OSTEOARTHRITIS, UNSPECIFIED HIP: ICD-10-CM

## 2024-04-30 PROCEDURE — 99213 OFFICE O/P EST LOW 20 MIN: CPT

## 2024-04-30 PROCEDURE — 73130 X-RAY EXAM OF HAND: CPT | Mod: RT

## 2024-04-30 RX ORDER — NAPROXEN 500 MG/1
500 TABLET ORAL TWICE DAILY
Qty: 14 | Refills: 1 | Status: ACTIVE | COMMUNITY
Start: 2024-04-30 | End: 1900-01-01

## 2024-04-30 NOTE — ASSESSMENT
[FreeTextEntry1] : Patient has signs shown some improvement in his right hip pain with the recent physical therapy.  He will continue doing the therapy and also continue work on exercises on his own and follow-up in approximately 2 to 3 weeks.

## 2024-04-30 NOTE — HISTORY OF PRESENT ILLNESS
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

## 2024-04-30 NOTE — REASON FOR VISIT
[FreeTextEntry2] : Continued right hip pain Patient returns to the office in follow-up regarding his right hip . pain is better with PT Patient reports that after 2 weeks of physical therapy he does have some improvement in the pain in the right hip examination today reveals ambulation with a fairly normal gait he has no significant trochanteric bursitis range of motion right hip is good to external rotation but does have some increased limitation to internal rotation of the right hip

## 2024-05-01 ENCOUNTER — RESULT CHARGE (OUTPATIENT)
Age: 59
End: 2024-05-01

## 2024-05-01 ENCOUNTER — APPOINTMENT (OUTPATIENT)
Dept: ORTHOPEDIC SURGERY | Facility: CLINIC | Age: 59
End: 2024-05-01
Payer: OTHER MISCELLANEOUS

## 2024-05-01 VITALS — HEIGHT: 70 IN | WEIGHT: 170 LBS | BODY MASS INDEX: 24.34 KG/M2

## 2024-05-01 DIAGNOSIS — M25.811 OTHER SPECIFIED JOINT DISORDERS, RIGHT SHOULDER: ICD-10-CM

## 2024-05-01 PROCEDURE — 99213 OFFICE O/P EST LOW 20 MIN: CPT | Mod: 25

## 2024-05-01 PROCEDURE — 73030 X-RAY EXAM OF SHOULDER: CPT | Mod: RT

## 2024-05-01 PROCEDURE — 20610 DRAIN/INJ JOINT/BURSA W/O US: CPT | Mod: RT

## 2024-05-01 NOTE — PROCEDURE
[FreeTextEntry3] : Mixture of 1 cc of lidocaine and 1 cc of dexamethasone mixed together the area of the shoulder and in the front of the AC joint was cleaned off with alcohol and injection was administered into the AC joint with no significant problems patient tolerated the procedure well woman with minimal discomfort.

## 2024-05-01 NOTE — REASON FOR VISIT
[FreeTextEntry2] : Patient returns to the office in follow-up regarding his right shoulder.  He is having more pain over the last several days than he has in his previous visits.  Examination reveals some moderate limitation to external rotation and also some mild limitation to abduction of the shoulder he has no significant swelling and he has no gross instability in the shoulder he is locally tender mainly at the AC joint and less at the proximal biceps tendon he does have some mild crepitus on range of motion of the left shoulder and his strength is decreased at 4/5

## 2024-05-01 NOTE — ASSESSMENT
[FreeTextEntry1] : Hopefully the shot will help alleviate some of his flareup of symptoms that he is having in the right shoulder at this point in time we are still awaiting authorization from [employer] to do the AC decompression of the shoulder and trying to prevent further damage to the rotator cuff in the future.  He will contact us once this is done.

## 2024-05-03 NOTE — HISTORY OF PRESENT ILLNESS
[Work related] : work related [8] : 8 [Dull/Aching] : dull/aching [FreeTextEntry7] : to hand [de-identified] : PT [6] : 6 [4] : 4 [Throbbing] : throbbing [Intermittent] : intermittent [Nothing helps with pain getting better] : Nothing helps with pain getting better [Full time] : Work status: full time [] : no [FreeTextEntry1] : right shoulder [FreeTextEntry3] : 11/2/23 [de-identified] : 4/10/24 [de-identified] :

## 2024-05-03 NOTE — HISTORY OF PRESENT ILLNESS
[Work related] : work related [8] : 8 [Dull/Aching] : dull/aching [FreeTextEntry7] : to hand [de-identified] : PT [6] : 6 [4] : 4 [Throbbing] : throbbing [Intermittent] : intermittent [Nothing helps with pain getting better] : Nothing helps with pain getting better [Full time] : Work status: full time [] : no [FreeTextEntry1] : right shoulder [FreeTextEntry3] : 11/2/23 [de-identified] : 4/10/24 [de-identified] :

## 2024-06-04 ENCOUNTER — APPOINTMENT (OUTPATIENT)
Dept: ORTHOPEDIC SURGERY | Facility: CLINIC | Age: 59
End: 2024-06-04
Payer: OTHER MISCELLANEOUS

## 2024-06-04 ENCOUNTER — APPOINTMENT (OUTPATIENT)
Dept: ORTHOPEDIC SURGERY | Facility: CLINIC | Age: 59
End: 2024-06-04

## 2024-06-04 VITALS — WEIGHT: 170 LBS | BODY MASS INDEX: 24.34 KG/M2 | HEIGHT: 70 IN

## 2024-06-04 DIAGNOSIS — M75.41 IMPINGEMENT SYNDROME OF RIGHT SHOULDER: ICD-10-CM

## 2024-06-04 DIAGNOSIS — M19.011 PRIMARY OSTEOARTHRITIS, RIGHT SHOULDER: ICD-10-CM

## 2024-06-04 DIAGNOSIS — M75.21 BICIPITAL TENDINITIS, RIGHT SHOULDER: ICD-10-CM

## 2024-06-04 DIAGNOSIS — M75.111 INCOMPLETE ROTATOR CUFF TEAR OR RUPTURE OF RIGHT SHOULDER, NOT SPECIFIED AS TRAUMATIC: ICD-10-CM

## 2024-06-04 DIAGNOSIS — S43.431A SUPERIOR GLENOID LABRUM LESION OF RIGHT SHOULDER, INITIAL ENCOUNTER: ICD-10-CM

## 2024-06-04 PROCEDURE — J3490M: CUSTOM

## 2024-06-04 PROCEDURE — 20611 DRAIN/INJ JOINT/BURSA W/US: CPT | Mod: RT

## 2024-06-04 PROCEDURE — 99204 OFFICE O/P NEW MOD 45 MIN: CPT | Mod: 25

## 2024-06-04 NOTE — HISTORY OF PRESENT ILLNESS
[de-identified] : WC 11/2/23  58 year old male  (RHD,  for TOB)  right shoulder pain since 11/2/23 injury at work he tripped over a tree branch and landed on his right shoulder. Saw Dr. Tapia for XRs, MRI OC 11/22/23  The pain is located ant, lateral, posterior, deep The pain is associated with clicking, popping, throbbing, aching, weakness  Worse with activity and better at rest. Has tried CSIs, PT with [facility], activity mod

## 2024-06-04 NOTE — ASSESSMENT
[FreeTextEntry1] : mri right shoulder 11/22/23 - RTC tendinopathy with fraying, labral fraying, bursitis, bicep tendonits, ac deg      - We discussed their diagnosis and treatment options at length including the risks and benefits of both surgical and non-surgical options. Surgical risks include but are not limited to pain, infection, bleeding, vascular injury, numbness, tingling, nerve damage. - Due to risks of surgery, we will continue conservative treatment with PT, icing, and anti-inflammatory medications - The patient was provided with a prescription to work on scapular strengthening and rotator cuff strengthening. - The patient is to continue home exercises learned at physical therapy. - The patient was advised to let pain guide the gradual advancement of activities. - We also discussed the possible of a corticosteroid injection in order to help decrease inflammation and pain so that they can perform better therapy. - The risks, benefits, and alternatives to corticosteroid injection were reviewed with the patient and they wished to proceed with this treatment course. - Follow up in 6 weeks to re-evaluate progress with therapy - if not better, will get MRI to eval RTC tear   Medication Discussion: 1) We discussed a comprehensive treatment plan that included possible pharmaceutical management involving the use of prescription strength medications including but not limited to options such as oral Naprosyn 500mg BID, once daily Meloxicam 15 mg, or 500-650 mg Tylenol versus over the counter oral medications in addition to discussing possible topical prescription Pennsaid vs  Voltaren gel. 2) There is a moderate risk of morbidity with further treatment, especially from use of prescription strength medications and possible side effects of these medications which include but are not limited to upset stomach with oral medications, skin reactions to topical medications and GI/cardiac/renal issues with long term use. 3) I recommended that the patient follow-up with their medical physician if there are any significant potential issues with long term medication use such as interactions with current medications or with exacerbation of underlying medical comorbidities. 4) The benefits and risks associated with use of oral and / or topical prescription and over the counter anti-inflammatory medications were discussed with the patient. The patient voiced understanding of the risks including but not limited to bleeding, stroke, kidney dysfunction, heart disease, and were referred to the black box warning label for further information.

## 2024-06-04 NOTE — IMAGING
[de-identified] :  RIGHT SHOULDER Inspection: No swelling.  Palpation: Tenderness is noted at the bicipital groove, anterior and lateral.  Range of motion: There is pain with range of motion. , ER 55, @90ER 90, @90IR 30 Strength: There is pain and discomfort with strength testing. Forward Flexion 4/5. Abduction 4/5.  External Rotation 5-/5 and Internal Rotation 5/5  Neurological testings: motor and sensor intact distally. Ligament Stability and Special Tests:  There is positive arc of pain.  Shoulder apprehension: neg Shoulder relocation: neg Obriens test: pos Biceps Active test: neg Harrison Labral Shear: neg Impingement testing: pos Shamika testing: pos Whipple: pos Cross Body Adduction: pos

## 2024-06-14 ENCOUNTER — APPOINTMENT (OUTPATIENT)
Dept: ORTHOPEDIC SURGERY | Facility: CLINIC | Age: 59
End: 2024-06-14
Payer: COMMERCIAL

## 2024-06-14 VITALS — BODY MASS INDEX: 24.34 KG/M2 | WEIGHT: 170 LBS | HEIGHT: 70 IN

## 2024-06-14 DIAGNOSIS — M18.11 UNILATERAL PRIMARY OSTEOARTHRITIS OF FIRST CARPOMETACARPAL JOINT, RIGHT HAND: ICD-10-CM

## 2024-06-14 PROCEDURE — 20600 DRAIN/INJ JOINT/BURSA W/O US: CPT | Mod: RT

## 2024-06-14 PROCEDURE — 99204 OFFICE O/P NEW MOD 45 MIN: CPT | Mod: 25

## 2024-06-14 PROCEDURE — J3490M: CUSTOM

## 2024-06-14 NOTE — HISTORY OF PRESENT ILLNESS
[de-identified] : R thumb CMC pain and swelling  [5] : 5 [4] : 4 [Dull/Aching] : dull/aching [Ice] : ice [] : no [FreeTextEntry1] : R thumb [FreeTextEntry3] : NOV 2023 [FreeTextEntry5] : seen Dr. [id number] he fell during work [de-identified] : activity

## 2024-06-14 NOTE — ASSESSMENT
[FreeTextEntry1] : The patient was advised of the diagnosis. The natural history of the pathology was explained in full to the patient in layman's terms. All questions were answered. The risks and benefits of surgical and non-surgical treatment alternatives were explained in full to the patient.  R thumb CMC injection was performed to treat inflammation  Anesthesia: ethyl chloride sprayed topically Celestone 6mg: An injection of Celestone 1cc Lidocaine: An injection of Lidocaine 1% 1cc Marcaine: An injection of Marcaine 0.5% 1cc   Patient has tried OTC's and HEP After verbal consent using sterile preparation and technique. The risks, benefits, and alternatives to cortisone injection were explained in full to the patient. Risks outlined include but are not limited to infection, sepsis, bleeding, scarring, skin discoloration, temporary increase in pain, syncopal episode, failure to resolve symptoms, allergic reaction, symptom recurrence, and elevation of blood sugar in diabetics. Patient understood the risks. All questions were answered. After discussion of options, patient requested an injection. Oral informed consent was obtained and sterile prep was done of the injection site. Sterile technique was utilized for the procedure including the preparation of the solutions used for the injection. Patient tolerated the procedure well. Advised to ice the injection site this evening. Prep with betadine locally to site. Sterile technique used   I rec therapy for stiffness to improve ROM

## 2024-06-14 NOTE — PHYSICAL EXAM
[de-identified] : R hand:  +thumb CMC swelling  +thumb CMC tenderness  Decreased thumb ROM  +Basal grind test  Xrays CMC OA

## 2024-07-23 ENCOUNTER — APPOINTMENT (OUTPATIENT)
Dept: ORTHOPEDIC SURGERY | Facility: CLINIC | Age: 59
End: 2024-07-23
Payer: OTHER MISCELLANEOUS

## 2024-07-23 VITALS — BODY MASS INDEX: 24.34 KG/M2 | WEIGHT: 170 LBS | HEIGHT: 70 IN

## 2024-07-23 DIAGNOSIS — M75.41 IMPINGEMENT SYNDROME OF RIGHT SHOULDER: ICD-10-CM

## 2024-07-23 DIAGNOSIS — M75.111 INCOMPLETE ROTATOR CUFF TEAR OR RUPTURE OF RIGHT SHOULDER, NOT SPECIFIED AS TRAUMATIC: ICD-10-CM

## 2024-07-23 PROCEDURE — 99214 OFFICE O/P EST MOD 30 MIN: CPT

## 2024-07-23 RX ORDER — MELOXICAM 15 MG/1
15 TABLET ORAL
Qty: 30 | Refills: 1 | Status: ACTIVE | COMMUNITY
Start: 2024-07-23 | End: 1900-01-01

## 2024-07-23 NOTE — IMAGING
Pt wife called sting that her  was d/c from ER yesterday (08/29/2019). She states that the pt was suppose to take Metamucil, but at the ER they were told that the pt cannot take any psyllium. She states that pt haven't had any bowel movement since yesterday. She is wondering what they can to to help with his bowel movement. She asks for a call back. Her phone # [phone]. Thank you.    [de-identified] :  RIGHT SHOULDER Inspection: No swelling.  Palpation: Tenderness is noted at the bicipital groove, anterior and lateral.  Range of motion: There is pain with range of motion. , ER 55, @90ER 90, @90IR 30 Strength: There is pain and discomfort with strength testing. Forward Flexion 4/5. Abduction 4/5.  External Rotation 5-/5 and Internal Rotation 5/5  Neurological testings: motor and sensor intact distally. Ligament Stability and Special Tests:  There is positive arc of pain.  Shoulder apprehension: neg Shoulder relocation: neg Obriens test: pos Biceps Active test: neg Harrison Labral Shear: neg Impingement testing: pos Shamika testing: pos Whipple: pos Cross Body Adduction: pos

## 2024-07-23 NOTE — HISTORY OF PRESENT ILLNESS
[de-identified] : WC 11/2/23  58 year old male  (RHD,  for TOB)  right shoulder pain since 11/2/23 injury at work he tripped over a tree branch and landed on his right shoulder. Saw Dr. Tapia for XRs, MRI [facility] 11/22/23  The pain is located ant, lateral, posterior, deep The pain is associated with clicking, popping, throbbing, aching, weakness  Worse with activity and better at rest. Has tried CSIs, PT with [facility], activity mod  7/23/24 - CSI last visit 6/4/24 with only temp relief, doing PT at [facility]. Continued shoulder pain with activity and at rest.

## 2024-07-23 NOTE — ASSESSMENT
[FreeTextEntry1] : mri right shoulder 11/22/23 - RTC tendinopathy with fraying, labral fraying, bursitis, bicep tendonits, ac deg    - We discussed their diagnosis and treatment options at length including the risks and benefits of both surgical and non-surgical options. Surgical risks include but are not limited to pain, infection, bleeding, vascular injury, numbness, tingling, nerve damage. - Due to risks of surgery, they will continue conservative treatment with PT, icing, and anti-inflammatory medications. - The patient was provided with a prescription to work on scapular strengthening and rotator cuff strengthening. - The patient is to continue home exercises learned at physical therapy. - The patient was advised to let pain guide the gradual advancement of activities. - mobic rx - Patient was given a prescription for an anti-inflammatory medication.  They will take it for the next week and then on an as needed basis, as long as there are no medical contra-indications.  Patient is counseled on possible GI, renal, and cardiovascular side effects. -  MRI to eval for pregreesison of RTC tear - fu after mri

## 2024-07-26 ENCOUNTER — APPOINTMENT (OUTPATIENT)
Dept: ORTHOPEDIC SURGERY | Facility: CLINIC | Age: 59
End: 2024-07-26
Payer: COMMERCIAL

## 2024-07-26 VITALS — HEIGHT: 70 IN | WEIGHT: 170 LBS | BODY MASS INDEX: 24.34 KG/M2

## 2024-07-26 DIAGNOSIS — M18.11 UNILATERAL PRIMARY OSTEOARTHRITIS OF FIRST CARPOMETACARPAL JOINT, RIGHT HAND: ICD-10-CM

## 2024-07-26 PROCEDURE — 99213 OFFICE O/P EST LOW 20 MIN: CPT

## 2024-07-26 NOTE — HISTORY OF PRESENT ILLNESS
[5] : 5 [4] : 4 [Dull/Aching] : dull/aching [de-identified] : R thumb CMC injection did not help He has continued pain and swelling  [FreeTextEntry1] : right thumb [de-identified] : inj

## 2024-07-26 NOTE — ASSESSMENT
[FreeTextEntry1] : With injection not helping I rec R thumb CMC arthroplasty suspensionplasty R/B/A of surgery discussed with the patient. Risks including but not limited to infection, nerve damage, tendon damage, pain, stiffness, recurrence, no resolution of symptoms, loss of function, limb or life. They understand and agree to surgery  Return post op

## 2024-07-26 NOTE — PHYSICAL EXAM
[de-identified] : R hand: +thumb CMC swelling +thumb CMC tenderness Decreased thumb ROM +Basal grind test  Xrays CMC OA

## 2024-08-20 ENCOUNTER — APPOINTMENT (OUTPATIENT)
Dept: ORTHOPEDIC SURGERY | Facility: CLINIC | Age: 59
End: 2024-08-20
Payer: OTHER MISCELLANEOUS

## 2024-08-20 VITALS — BODY MASS INDEX: 24.34 KG/M2 | WEIGHT: 170 LBS | HEIGHT: 70 IN

## 2024-08-20 DIAGNOSIS — S43.431A SUPERIOR GLENOID LABRUM LESION OF RIGHT SHOULDER, INITIAL ENCOUNTER: ICD-10-CM

## 2024-08-20 DIAGNOSIS — M75.111 INCOMPLETE ROTATOR CUFF TEAR OR RUPTURE OF RIGHT SHOULDER, NOT SPECIFIED AS TRAUMATIC: ICD-10-CM

## 2024-08-20 DIAGNOSIS — M75.41 IMPINGEMENT SYNDROME OF RIGHT SHOULDER: ICD-10-CM

## 2024-08-20 DIAGNOSIS — M75.21 BICIPITAL TENDINITIS, RIGHT SHOULDER: ICD-10-CM

## 2024-08-20 DIAGNOSIS — M19.011 PRIMARY OSTEOARTHRITIS, RIGHT SHOULDER: ICD-10-CM

## 2024-08-20 PROCEDURE — 99214 OFFICE O/P EST MOD 30 MIN: CPT

## 2024-08-20 RX ORDER — NAPROXEN 500 MG/1
500 TABLET ORAL TWICE DAILY
Qty: 30 | Refills: 0 | Status: ACTIVE | COMMUNITY
Start: 2024-08-20 | End: 1900-01-01

## 2024-08-20 NOTE — HISTORY OF PRESENT ILLNESS
[de-identified] :  11/2/23  59 year old male  (RHD,  for TOB)  right shoulder pain since 11/2/23 injury at work he tripped over a tree branch and landed on his right shoulder. Saw Dr. Tapia for XRs, MRI [facility] 11/22/23  The pain is located ant, lateral, posterior, deep The pain is associated with clicking, popping, throbbing, aching, weakness  Worse with activity and better at rest. Has tried CSIs, PT with [facility], HEP, activity mod  7/23/24 - CSI last visit 6/4/24 with only temp relief, doing PT at [facility] [place] [facility]. Continued shoulder pain with activity and at rest.   8/20/24 - MRI not approved by . Shoulder pain, clicking and popping continues despite PT and HEP

## 2024-08-20 NOTE — IMAGING
[de-identified] :  RIGHT SHOULDER Inspection: No swelling.  Palpation: Tenderness is noted at the bicipital groove, anterior and lateral.  Range of motion: There is pain with range of motion. , ER 55, @90ER 90, @90IR 30 Strength: There is pain and discomfort with strength testing. Forward Flexion 4/5. Abduction 4/5.  External Rotation 5-/5 and Internal Rotation 5/5  Neurological testings: motor and sensor intact distally. Ligament Stability and Special Tests:  There is positive arc of pain.  Shoulder apprehension: neg Shoulder relocation: neg Obriens test: pos Biceps Active test: neg Harrison Labral Shear: neg Impingement testing: pos Shamika testing: pos Whipple: pos Cross Body Adduction: pos

## 2024-08-20 NOTE — ASSESSMENT
[FreeTextEntry1] : mri right shoulder 11/22/23 - RTC tendinopathy with fraying, labral fraying, bursitis, bicep tendonits, ac deg    - We discussed their diagnosis and treatment options at length including the risks and benefits of both surgical and non-surgical options. Surgical risks include but are not limited to pain, infection, bleeding, vascular injury, numbness, tingling, nerve damage. - Due to risks of surgery, they will continue conservative treatment with PT, icing, and anti-inflammatory medications. - The patient was provided with a prescription to work on scapular strengthening and rotator cuff strengthening. - The patient is to continue home exercises learned at physical therapy. - The patient was advised to let pain guide the gradual advancement of activities. - Naproyn  rx - Patient was given a prescription for an anti-inflammatory medication.  They will take it for the next week and then on an as needed basis, as long as there are no medical contra-indications.  Patient is counseled on possible GI, renal, and cardiovascular side effects. - updated MRI to eval for pregreesison of RTC tear so can decide on surgery - fu after mri

## 2024-08-30 ENCOUNTER — APPOINTMENT (OUTPATIENT)
Dept: MRI IMAGING | Facility: CLINIC | Age: 59
End: 2024-08-30
Payer: OTHER MISCELLANEOUS

## 2024-08-30 PROCEDURE — 73221 MRI JOINT UPR EXTREM W/O DYE: CPT | Mod: RT

## 2024-09-04 ENCOUNTER — APPOINTMENT (OUTPATIENT)
Dept: ORTHOPEDIC SURGERY | Facility: AMBULATORY SURGERY CENTER | Age: 59
End: 2024-09-04
Payer: COMMERCIAL

## 2024-09-04 PROCEDURE — 25447 ARTHRP NTRCRP/CRP/MTCR NTRPS: CPT | Mod: RT

## 2024-09-04 PROCEDURE — 25447 ARTHRP NTRCRP/CRP/MTCR NTRPS: CPT | Mod: AS,RT

## 2024-09-04 RX ORDER — OXYCODONE AND ACETAMINOPHEN 5; 325 MG/1; MG/1
5-325 TABLET ORAL
Qty: 30 | Refills: 0 | Status: ACTIVE | COMMUNITY
Start: 2024-09-04 | End: 1900-01-01

## 2024-09-10 ENCOUNTER — APPOINTMENT (OUTPATIENT)
Dept: ORTHOPEDIC SURGERY | Facility: CLINIC | Age: 59
End: 2024-09-10
Payer: OTHER MISCELLANEOUS

## 2024-09-10 VITALS — HEIGHT: 70 IN | WEIGHT: 170 LBS | BODY MASS INDEX: 24.34 KG/M2

## 2024-09-10 DIAGNOSIS — M75.21 BICIPITAL TENDINITIS, RIGHT SHOULDER: ICD-10-CM

## 2024-09-10 DIAGNOSIS — S43.431A SUPERIOR GLENOID LABRUM LESION OF RIGHT SHOULDER, INITIAL ENCOUNTER: ICD-10-CM

## 2024-09-10 DIAGNOSIS — M75.41 IMPINGEMENT SYNDROME OF RIGHT SHOULDER: ICD-10-CM

## 2024-09-10 DIAGNOSIS — M19.011 PRIMARY OSTEOARTHRITIS, RIGHT SHOULDER: ICD-10-CM

## 2024-09-10 DIAGNOSIS — M75.111 INCOMPLETE ROTATOR CUFF TEAR OR RUPTURE OF RIGHT SHOULDER, NOT SPECIFIED AS TRAUMATIC: ICD-10-CM

## 2024-09-10 PROCEDURE — 20611 DRAIN/INJ JOINT/BURSA W/US: CPT | Mod: RT

## 2024-09-10 PROCEDURE — 99214 OFFICE O/P EST MOD 30 MIN: CPT | Mod: 25

## 2024-09-10 PROCEDURE — J3490M: CUSTOM

## 2024-09-10 NOTE — HISTORY OF PRESENT ILLNESS
[de-identified] :  11/2/23  59 year old male  (RHD,  for TOB)  right shoulder pain since 11/2/23 injury at work he tripped over a tree branch and landed on his right shoulder. Saw Dr. Tapia for XRs, MRI [facility] 11/22/23  The pain is located ant, lateral, posterior, deep The pain is associated with clicking, popping, throbbing, aching, weakness  Worse with activity and better at rest. Has tried CSIs, PT with [facility], HEP, activity mod  7/23/24 - CSI last visit 6/4/24 with only temp relief, doing PT at [facility] [place]. Continued shoulder pain with activity and at rest.   8/20/24 - MRI not approved by . Shoulder pain, clicking and popping continues despite PT and HEP 9/10/24 - had updated mri, cont pain with [place] activity. Had R thumb surgery 9/4/24 (Dr. Reilly).

## 2024-09-10 NOTE — ASSESSMENT
[FreeTextEntry1] : mri right shoulder 11/22/23 - RTC tendinopathy with fraying, labral fraying, bursitis, bicep tendonits, ac deg mri right shoulder 8/30/24- RTC tendinopathy with partial interstial tearing, labral fraying,, bicep tendonits, ac deg     - The patient was advised of the diagnosis.  The natural history of the pathology was explained to the patient in layman's terms.  Several different treatment options were discussed and explained including the risks and benefits of both surgical and non-surgical treatments.   Surgical risks include but are not limited to pain, infection, bleeding, vascular injury, numbness, tingling, nerve damage. - Due to risks of surgery, they will continue conservative treatment with PT, icing, and anti-inflammatory medications. - The patient was provided with a prescription to work on scapular strengthening and rotator cuff strengthening. - The patient is to continue home exercises learned at physical therapy. - The patient was advised to let pain guide the gradual advancement of activities. - We also discussed the possible of a corticosteroid injection in order to help decrease inflammation and pain so that they can perform better therapy and they wished to proceed with this treatment course. - fu 6 week re-eval

## 2024-09-10 NOTE — IMAGING
[de-identified] :  RIGHT SHOULDER Inspection: No swelling.  Palpation: Tenderness is noted at the bicipital groove, anterior and lateral.  Range of motion: There is pain with range of motion. , ER 55, @90ER 90, @90IR 30 Strength: There is pain and discomfort with strength testing. Forward Flexion 4/5. Abduction 4/5.  External Rotation 5-/5 and Internal Rotation 5/5  Neurological testings: motor and sensor intact distally. Ligament Stability and Special Tests:  There is positive arc of pain.  Shoulder apprehension: neg Shoulder relocation: neg Obriens test: pos Biceps Active test: neg Harrison Labral Shear: neg Impingement testing: pos Shamika testing: pos Whipple: pos Cross Body Adduction: pos

## 2024-09-13 ENCOUNTER — APPOINTMENT (OUTPATIENT)
Dept: ORTHOPEDIC SURGERY | Facility: CLINIC | Age: 59
End: 2024-09-13
Payer: COMMERCIAL

## 2024-09-13 VITALS — BODY MASS INDEX: 24.34 KG/M2 | HEIGHT: 70 IN | WEIGHT: 170 LBS

## 2024-09-13 DIAGNOSIS — M18.11 UNILATERAL PRIMARY OSTEOARTHRITIS OF FIRST CARPOMETACARPAL JOINT, RIGHT HAND: ICD-10-CM

## 2024-09-13 PROCEDURE — 99024 POSTOP FOLLOW-UP VISIT: CPT

## 2024-09-13 PROCEDURE — 73140 X-RAY EXAM OF FINGER(S): CPT | Mod: RT

## 2024-09-13 NOTE — PHYSICAL EXAM
[de-identified] : Mild hand swelling Healed incision No evidence of infection Mild tenderness at the surgical site Thumb stiffness  Xrays well aligned; no loss of surgical correction

## 2024-09-13 NOTE — ASSESSMENT
[FreeTextEntry1] : Therapy OT Brace Sutures removed Steris applied Light activities and advance as tolerated Return in 2 weeks

## 2024-09-13 NOTE — HISTORY OF PRESENT ILLNESS
[5] : 5 [Dull/Aching] : dull/aching [] : Post Surgical Visit: yes [de-identified] : R thumb CMC arthroplasty last week He has pain  [8] : 8 [FreeTextEntry1] : R thumb [de-identified] : 9/4/24 [de-identified] :  R thumb CMC arthroplasty suspensionplasty

## 2024-09-25 ENCOUNTER — RX RENEWAL (OUTPATIENT)
Age: 59
End: 2024-09-25

## 2024-09-27 ENCOUNTER — APPOINTMENT (OUTPATIENT)
Dept: ORTHOPEDIC SURGERY | Facility: CLINIC | Age: 59
End: 2024-09-27
Payer: COMMERCIAL

## 2024-09-27 VITALS — WEIGHT: 170 LBS | BODY MASS INDEX: 24.34 KG/M2 | HEIGHT: 70 IN

## 2024-09-27 DIAGNOSIS — M18.11 UNILATERAL PRIMARY OSTEOARTHRITIS OF FIRST CARPOMETACARPAL JOINT, RIGHT HAND: ICD-10-CM

## 2024-09-27 PROCEDURE — 99024 POSTOP FOLLOW-UP VISIT: CPT

## 2024-09-27 NOTE — HISTORY OF PRESENT ILLNESS
[Dull/Aching] : dull/aching [] : Post Surgical Visit: yes [de-identified] : R thumb CMC arthroplasty  3 weeks He has some stiffness  [3] : 3 [2] : 2 [FreeTextEntry1] : R thumb [de-identified] : ot brace, therapy  [de-identified] : 9/4/24 [de-identified] :  R thumb CMC arthroplasty suspensionplasty

## 2024-10-18 ENCOUNTER — APPOINTMENT (OUTPATIENT)
Dept: ORTHOPEDIC SURGERY | Facility: CLINIC | Age: 59
End: 2024-10-18
Payer: COMMERCIAL

## 2024-10-18 VITALS — BODY MASS INDEX: 24.34 KG/M2 | WEIGHT: 170 LBS | HEIGHT: 70 IN

## 2024-10-18 DIAGNOSIS — M18.11 UNILATERAL PRIMARY OSTEOARTHRITIS OF FIRST CARPOMETACARPAL JOINT, RIGHT HAND: ICD-10-CM

## 2024-10-18 PROCEDURE — 99024 POSTOP FOLLOW-UP VISIT: CPT

## 2024-10-18 RX ORDER — METHYLPREDNISOLONE 4 MG/1
4 TABLET ORAL
Qty: 1 | Refills: 0 | Status: ACTIVE | COMMUNITY
Start: 2024-10-18 | End: 1900-01-01

## 2024-10-22 ENCOUNTER — APPOINTMENT (OUTPATIENT)
Dept: ORTHOPEDIC SURGERY | Facility: CLINIC | Age: 59
End: 2024-10-22
Payer: OTHER MISCELLANEOUS

## 2024-10-22 VITALS — WEIGHT: 170 LBS | HEIGHT: 70 IN | BODY MASS INDEX: 24.34 KG/M2

## 2024-10-22 DIAGNOSIS — M19.011 PRIMARY OSTEOARTHRITIS, RIGHT SHOULDER: ICD-10-CM

## 2024-10-22 DIAGNOSIS — S43.431A SUPERIOR GLENOID LABRUM LESION OF RIGHT SHOULDER, INITIAL ENCOUNTER: ICD-10-CM

## 2024-10-22 DIAGNOSIS — M75.111 INCOMPLETE ROTATOR CUFF TEAR OR RUPTURE OF RIGHT SHOULDER, NOT SPECIFIED AS TRAUMATIC: ICD-10-CM

## 2024-10-22 DIAGNOSIS — M75.41 IMPINGEMENT SYNDROME OF RIGHT SHOULDER: ICD-10-CM

## 2024-10-22 PROCEDURE — 99455 WORK RELATED DISABILITY EXAM: CPT

## 2024-11-15 ENCOUNTER — APPOINTMENT (OUTPATIENT)
Dept: ORTHOPEDIC SURGERY | Facility: CLINIC | Age: 59
End: 2024-11-15

## 2024-11-26 ENCOUNTER — APPOINTMENT (OUTPATIENT)
Dept: ORTHOPEDIC SURGERY | Facility: CLINIC | Age: 59
End: 2024-11-26
Payer: OTHER MISCELLANEOUS

## 2024-11-26 DIAGNOSIS — M75.111 INCOMPLETE ROTATOR CUFF TEAR OR RUPTURE OF RIGHT SHOULDER, NOT SPECIFIED AS TRAUMATIC: ICD-10-CM

## 2024-11-26 DIAGNOSIS — M75.21 BICIPITAL TENDINITIS, RIGHT SHOULDER: ICD-10-CM

## 2024-11-26 DIAGNOSIS — M19.011 PRIMARY OSTEOARTHRITIS, RIGHT SHOULDER: ICD-10-CM

## 2024-11-26 DIAGNOSIS — M75.41 IMPINGEMENT SYNDROME OF RIGHT SHOULDER: ICD-10-CM

## 2024-11-26 DIAGNOSIS — S43.431A SUPERIOR GLENOID LABRUM LESION OF RIGHT SHOULDER, INITIAL ENCOUNTER: ICD-10-CM

## 2024-11-26 PROCEDURE — 99213 OFFICE O/P EST LOW 20 MIN: CPT

## 2024-12-20 ENCOUNTER — APPOINTMENT (OUTPATIENT)
Dept: ORTHOPEDIC SURGERY | Facility: CLINIC | Age: 59
End: 2024-12-20
Payer: COMMERCIAL

## 2024-12-20 VITALS — BODY MASS INDEX: 24.34 KG/M2 | WEIGHT: 170 LBS | HEIGHT: 70 IN

## 2024-12-20 DIAGNOSIS — M18.11 UNILATERAL PRIMARY OSTEOARTHRITIS OF FIRST CARPOMETACARPAL JOINT, RIGHT HAND: ICD-10-CM

## 2024-12-20 PROCEDURE — 99213 OFFICE O/P EST LOW 20 MIN: CPT

## 2025-02-25 ENCOUNTER — APPOINTMENT (OUTPATIENT)
Dept: ORTHOPEDIC SURGERY | Facility: CLINIC | Age: 60
End: 2025-02-25
Payer: OTHER MISCELLANEOUS

## 2025-02-25 VITALS — WEIGHT: 170 LBS | BODY MASS INDEX: 24.34 KG/M2 | HEIGHT: 70 IN

## 2025-02-25 DIAGNOSIS — M75.41 IMPINGEMENT SYNDROME OF RIGHT SHOULDER: ICD-10-CM

## 2025-02-25 DIAGNOSIS — M75.21 BICIPITAL TENDINITIS, RIGHT SHOULDER: ICD-10-CM

## 2025-02-25 DIAGNOSIS — M75.111 INCOMPLETE ROTATOR CUFF TEAR OR RUPTURE OF RIGHT SHOULDER, NOT SPECIFIED AS TRAUMATIC: ICD-10-CM

## 2025-02-25 DIAGNOSIS — M19.011 PRIMARY OSTEOARTHRITIS, RIGHT SHOULDER: ICD-10-CM

## 2025-02-25 DIAGNOSIS — S43.431A SUPERIOR GLENOID LABRUM LESION OF RIGHT SHOULDER, INITIAL ENCOUNTER: ICD-10-CM

## 2025-02-25 PROCEDURE — 20611 DRAIN/INJ JOINT/BURSA W/US: CPT | Mod: RT

## 2025-02-25 PROCEDURE — 99214 OFFICE O/P EST MOD 30 MIN: CPT | Mod: 25

## 2025-02-25 PROCEDURE — J3490M: CUSTOM

## 2025-03-25 ENCOUNTER — APPOINTMENT (OUTPATIENT)
Dept: ORTHOPEDIC SURGERY | Facility: CLINIC | Age: 60
End: 2025-03-25

## 2025-04-15 ENCOUNTER — APPOINTMENT (OUTPATIENT)
Dept: ORTHOPEDIC SURGERY | Facility: CLINIC | Age: 60
End: 2025-04-15
Payer: OTHER MISCELLANEOUS

## 2025-04-15 DIAGNOSIS — M75.41 IMPINGEMENT SYNDROME OF RIGHT SHOULDER: ICD-10-CM

## 2025-04-15 DIAGNOSIS — M75.111 INCOMPLETE ROTATOR CUFF TEAR OR RUPTURE OF RIGHT SHOULDER, NOT SPECIFIED AS TRAUMATIC: ICD-10-CM

## 2025-04-15 DIAGNOSIS — M19.011 PRIMARY OSTEOARTHRITIS, RIGHT SHOULDER: ICD-10-CM

## 2025-04-15 PROCEDURE — 99214 OFFICE O/P EST MOD 30 MIN: CPT

## 2025-04-15 RX ORDER — IBUPROFEN 600 MG/1
600 TABLET, FILM COATED ORAL 3 TIMES DAILY
Qty: 60 | Refills: 0 | Status: ACTIVE | COMMUNITY
Start: 2025-04-15 | End: 1900-01-01

## 2025-05-09 ENCOUNTER — APPOINTMENT (OUTPATIENT)
Dept: ORTHOPEDIC SURGERY | Facility: CLINIC | Age: 60
End: 2025-05-09
Payer: COMMERCIAL

## 2025-05-09 VITALS — WEIGHT: 170 LBS | BODY MASS INDEX: 24.34 KG/M2 | HEIGHT: 70 IN

## 2025-05-09 DIAGNOSIS — M18.11 UNILATERAL PRIMARY OSTEOARTHRITIS OF FIRST CARPOMETACARPAL JOINT, RIGHT HAND: ICD-10-CM

## 2025-05-09 PROCEDURE — 73130 X-RAY EXAM OF HAND: CPT | Mod: RT

## 2025-05-09 PROCEDURE — 99213 OFFICE O/P EST LOW 20 MIN: CPT | Mod: 25

## 2025-05-09 PROCEDURE — 20600 DRAIN/INJ JOINT/BURSA W/O US: CPT | Mod: RT

## 2025-05-09 PROCEDURE — J3490M: CUSTOM

## 2025-06-13 ENCOUNTER — TRANSCRIPTION ENCOUNTER (OUTPATIENT)
Age: 60
End: 2025-06-13

## 2025-06-24 RX ORDER — MELOXICAM 15 MG/1
15 TABLET ORAL DAILY
Qty: 30 | Refills: 1 | Status: ACTIVE | COMMUNITY
Start: 2025-06-24 | End: 1900-01-01

## 2025-06-27 ENCOUNTER — APPOINTMENT (OUTPATIENT)
Dept: ORTHOPEDIC SURGERY | Facility: CLINIC | Age: 60
End: 2025-06-27

## 2025-07-11 ENCOUNTER — APPOINTMENT (OUTPATIENT)
Dept: ORTHOPEDIC SURGERY | Facility: CLINIC | Age: 60
End: 2025-07-11

## 2025-07-11 ENCOUNTER — APPOINTMENT (OUTPATIENT)
Dept: ORTHOPEDIC SURGERY | Facility: CLINIC | Age: 60
End: 2025-07-11
Payer: COMMERCIAL

## 2025-07-11 VITALS — HEIGHT: 70 IN | WEIGHT: 170 LBS | BODY MASS INDEX: 24.34 KG/M2

## 2025-07-11 PROCEDURE — 99214 OFFICE O/P EST MOD 30 MIN: CPT

## 2025-08-19 RX ORDER — OXYCODONE AND ACETAMINOPHEN 5; 325 MG/1; MG/1
5-325 TABLET ORAL 3 TIMES DAILY
Qty: 25 | Refills: 0 | Status: ACTIVE | COMMUNITY
Start: 2025-08-19 | End: 1900-01-01

## 2025-08-20 ENCOUNTER — APPOINTMENT (OUTPATIENT)
Dept: ORTHOPEDIC SURGERY | Facility: AMBULATORY SURGERY CENTER | Age: 60
End: 2025-08-20

## 2025-08-22 ENCOUNTER — APPOINTMENT (OUTPATIENT)
Dept: ORTHOPEDIC SURGERY | Facility: CLINIC | Age: 60
End: 2025-08-22

## 2025-08-29 ENCOUNTER — APPOINTMENT (OUTPATIENT)
Dept: ORTHOPEDIC SURGERY | Facility: CLINIC | Age: 60
End: 2025-08-29

## (undated) DEVICE — GLV 8 ULTRAFREE MAX

## (undated) DEVICE — CATH IV SAFE 24GX3/4

## (undated) DEVICE — GLV 7.5 ULTRAFREE MAX

## (undated) DEVICE — TRAY EPIDURAL SINGLE DOSE

## (undated) DEVICE — CATH IV SAFE BC BLU 22GAX1.0"

## (undated) DEVICE — NDL SPNL WHIT 22GX3.5IN

## (undated) DEVICE — SOL INJ LR 500ML

## (undated) DEVICE — PACK IV START WITH CHG